# Patient Record
Sex: MALE | Race: ASIAN | NOT HISPANIC OR LATINO | Employment: UNEMPLOYED | URBAN - METROPOLITAN AREA
[De-identification: names, ages, dates, MRNs, and addresses within clinical notes are randomized per-mention and may not be internally consistent; named-entity substitution may affect disease eponyms.]

---

## 2017-01-27 ENCOUNTER — OFFICE VISIT - HEALTHEAST (OUTPATIENT)
Dept: FAMILY MEDICINE | Facility: CLINIC | Age: 27
End: 2017-01-27

## 2017-01-27 DIAGNOSIS — Z23 INFLUENZA VACCINATION GIVEN: ICD-10-CM

## 2017-01-27 DIAGNOSIS — D17.1 LIPOMA OF BACK: ICD-10-CM

## 2017-01-27 DIAGNOSIS — J30.9 ALLERGIC RHINITIS: ICD-10-CM

## 2017-01-27 DIAGNOSIS — Z23 NEED FOR PROPHYLACTIC VACCINATION AND INOCULATION AGAINST INFLUENZA: ICD-10-CM

## 2017-01-27 DIAGNOSIS — Z76.89 ENCOUNTER TO ESTABLISH CARE: ICD-10-CM

## 2017-01-27 DIAGNOSIS — D17.1 LIPOMA OF ANTERIOR CHEST WALL: ICD-10-CM

## 2017-01-27 DIAGNOSIS — D17.1 LIPOMA OF ABDOMINAL WALL: ICD-10-CM

## 2017-01-27 ASSESSMENT — MIFFLIN-ST. JEOR: SCORE: 1953.11

## 2017-02-02 ENCOUNTER — COMMUNICATION - HEALTHEAST (OUTPATIENT)
Dept: SURGERY | Facility: CLINIC | Age: 27
End: 2017-02-02

## 2017-03-01 ENCOUNTER — OFFICE VISIT - HEALTHEAST (OUTPATIENT)
Dept: SURGERY | Facility: CLINIC | Age: 27
End: 2017-03-01

## 2017-03-01 DIAGNOSIS — D17.1 LIPOMA OF ABDOMINAL WALL: ICD-10-CM

## 2017-03-01 DIAGNOSIS — D17.1 LIPOMA OF FLANK: ICD-10-CM

## 2017-03-01 ASSESSMENT — MIFFLIN-ST. JEOR: SCORE: 1952.83

## 2017-08-02 ENCOUNTER — OFFICE VISIT - HEALTHEAST (OUTPATIENT)
Dept: FAMILY MEDICINE | Facility: CLINIC | Age: 27
End: 2017-08-02

## 2017-08-02 DIAGNOSIS — M70.72 BURSITIS, HIP, LEFT: ICD-10-CM

## 2017-08-02 DIAGNOSIS — M25.50 ARTHRALGIA: ICD-10-CM

## 2017-08-02 ASSESSMENT — MIFFLIN-ST. JEOR: SCORE: 1930.71

## 2017-08-03 LAB — ANA SER QL: 0.2 U

## 2017-08-07 ENCOUNTER — COMMUNICATION - HEALTHEAST (OUTPATIENT)
Dept: FAMILY MEDICINE | Facility: CLINIC | Age: 27
End: 2017-08-07

## 2017-08-15 ENCOUNTER — OFFICE VISIT - HEALTHEAST (OUTPATIENT)
Dept: FAMILY MEDICINE | Facility: CLINIC | Age: 27
End: 2017-08-15

## 2017-08-15 DIAGNOSIS — M25.50 ARTHRALGIA: ICD-10-CM

## 2017-08-18 ENCOUNTER — COMMUNICATION - HEALTHEAST (OUTPATIENT)
Dept: FAMILY MEDICINE | Facility: CLINIC | Age: 27
End: 2017-08-18

## 2017-11-30 ENCOUNTER — AMBULATORY - HEALTHEAST (OUTPATIENT)
Dept: NURSING | Facility: CLINIC | Age: 27
End: 2017-11-30

## 2017-11-30 DIAGNOSIS — Z23 NEED FOR IMMUNIZATION AGAINST INFLUENZA: ICD-10-CM

## 2018-08-20 ENCOUNTER — OFFICE VISIT - HEALTHEAST (OUTPATIENT)
Dept: FAMILY MEDICINE | Facility: CLINIC | Age: 28
End: 2018-08-20

## 2018-08-20 DIAGNOSIS — M79.662 PAIN OF LEFT LOWER LEG: ICD-10-CM

## 2018-08-20 RX ORDER — NAPROXEN 500 MG/1
500 TABLET ORAL 2 TIMES DAILY WITH MEALS
Qty: 60 TABLET | Refills: 3 | Status: SHIPPED | OUTPATIENT
Start: 2018-08-20 | End: 2024-04-20

## 2018-08-20 ASSESSMENT — MIFFLIN-ST. JEOR: SCORE: 1916.54

## 2019-06-18 ENCOUNTER — OFFICE VISIT - HEALTHEAST (OUTPATIENT)
Dept: FAMILY MEDICINE | Facility: CLINIC | Age: 29
End: 2019-06-18

## 2019-06-18 DIAGNOSIS — H10.13 ALLERGIC CONJUNCTIVITIS, BILATERAL: ICD-10-CM

## 2019-06-18 DIAGNOSIS — M25.561 ARTHRALGIA OF BOTH KNEES: ICD-10-CM

## 2019-06-18 DIAGNOSIS — R22.9 SUBCUTANEOUS NODULES: ICD-10-CM

## 2019-06-18 DIAGNOSIS — M25.562 ARTHRALGIA OF BOTH KNEES: ICD-10-CM

## 2019-06-18 LAB — ERYTHROCYTE [SEDIMENTATION RATE] IN BLOOD BY WESTERGREN METHOD: 18 MM/HR (ref 0–15)

## 2019-06-18 RX ORDER — OLOPATADINE HYDROCHLORIDE 1 MG/ML
SOLUTION/ DROPS OPHTHALMIC
Qty: 5 ML | Refills: 12 | Status: SHIPPED | OUTPATIENT
Start: 2019-06-18

## 2019-06-18 ASSESSMENT — MIFFLIN-ST. JEOR: SCORE: 1946.58

## 2019-06-19 ENCOUNTER — COMMUNICATION - HEALTHEAST (OUTPATIENT)
Dept: FAMILY MEDICINE | Facility: CLINIC | Age: 29
End: 2019-06-19

## 2021-05-29 NOTE — TELEPHONE ENCOUNTER
Fax received from [facility] requesting Prior Authorization    Medication Name:   olopatadine (PATANOL) 0.1 % ophthalmic solution 5 mL 12 6/18/2019     Sig: Apply 1 drop in each eye twice  daily    Class: Print          Insurance Plan: SUYAPA   PBM: [employer]   Patient ID Number: [id number]    Please start PA process

## 2021-05-29 NOTE — PROGRESS NOTES
ASSESSMENT AND PLAN:  1. Subcutaneous nodules  Relatively new not present 2 years ago the areas are not tender and are not enlarging he has 4 identifiable sites measured today.  He denies any tightening of the skin on his hands or face.  Denies any discomfort or problems with swallowing.  - Erythrocyte Sedimentation Rate    2. Arthralgia of both knees    Takes naproxen intermittently had a ESR which was grossly elevated 2 years ago takes a medication semi-regularly last took naproxen 4 weeks ago checking an ESR today.  - Erythrocyte Sedimentation Rate      3.  Allergic conjunctivitis of both eyes.  Patanol drops started.  Side effects discussed      Orders Placed This Encounter   Procedures     Erythrocyte Sedimentation Rate     There are no discontinued medications.    No follow-ups on file.    CHIEF COMPLAINT:  Chief Complaint   Patient presents with     Other     Check lumps on arm and body. Possible Lipoma     Eye itching     C/o both eyes itching x 2 days.        HISTORY OF PRESENT ILLNESS:  Clementina is a 29 y.o. male who presents to the clinic today for lumps on his body and eye itching. Clementina is present with a Kimberly . He has three lumps: one above his left nipple, one by his left elbow, and one on his abdomen. The patient has had them for several years. They have not changed. Of note, I last saw him two years ago for arthralgias that was managed well with naproxen though this was stopped a month ago. He does have history of elevated ESR though rheumatoid factor was negative. Clementina does report pain on the posterior aspect of his left knee, lower leg, and ankle. It is a dull type of pain. Walking does not exacerbate the pain.    He has had itchy eyes for two days.    REVIEW OF SYSTEMS:   Eyes: Itchy eyes.  Musculoskeletal: Some myalgias in left lower leg.  Skin: Lump above left nipple, by left elbow, and above his umbilicus.  All other 10 point review of systems are negative.    PFSH:  No family history of  "nodules or arthritis.  Reviewed as below.     TOBACCO USE:  Social History     Tobacco Use   Smoking Status Current Some Day Smoker   Smokeless Tobacco Never Used       VITALS:  Vitals:    06/18/19 1456   BP: 102/70   Patient Site: Left Arm   Pulse: 83   Resp: 16   Temp: 97.9  F (36.6  C)   TempSrc: Oral   SpO2: 96%   Weight: 210 lb (95.3 kg)   Height: 5' 11.75\" (1.822 m)     Wt Readings from Last 3 Encounters:   06/18/19 210 lb (95.3 kg)   08/20/18 206 lb (93.4 kg)   08/15/17 209 lb 2 oz (94.9 kg)     Body mass index is 28.68 kg/m .      PHYSICAL EXAM:  General: Alert, cooperative, no distress, appears stated age  HENT: Normocephalic, without obvious abnormality, atraumatic, moist mucous membranes  Eyes: PERRL, conjunctival injection noted lateral canthus of right eye medial canthus of left eye, EOM's intact  Lungs: Clear to auscultation bilaterally, respirations unlabored  Heart: Regular rate and rhythm, S1 and S2 normal, no murmur, rub, or gallop  Neurologic:  A & O x 3.  No tremor, no focal findings.  Normal gait.   Skin: Nodule noted above his left nipple. Another noted 2 cm inferior to left elbow. Third ntoed 4 cm superior to umbilicus.    DATA REVIEWED:  Additional History from Old Records Summarized (2): Reviewed old note from 08/15/2017 regarding arthralgias. Reviewed Dr. Zamora's 08/20/2019 note regarding left lower leg/knee pain.  Decision to Obtain Records (1): none  Radiology Tests Summarized or Ordered (1): none  Labs Reviewed or Ordered (1): ESR ordered.  Medicine Test Summarized or Ordered (1): none  Independent Review of EKG or X-RAY(2 each): none      IBeverly, am scribing for and in the presence of, Dr. Downing.    Dr. Chang GILBERT, personally performed the services described in this documentation, as scribed by Beverly Cadet in my presence, and it is both accurate and complete.      MEDICATIONS:  Current Outpatient Medications   Medication Sig Dispense Refill     naproxen (NAPROSYN) 500 MG tablet " Take 1 tablet (500 mg total) by mouth 2 (two) times a day with meals. 60 tablet 3     No current facility-administered medications for this visit.    Total amount time spent in today's visit was 25 minutes, greater than 50% of this time was spent discussing the pathophysiology of skin nodules, arthralgia conjunctivitis    Total Data Points: 1    Please note that this clinical encounter uses voice recognition software, there may be typographical errors present

## 2021-05-29 NOTE — TELEPHONE ENCOUNTER
Central PA team  [phone]  Pool: HE PA MED ([id number])          PA has been initiated.       PA form completed and faxed insurance via Cover My Meds     Key:  [id number]     Medication:  Olopatadine HCl 0.1% solution    Insurance:  [employer] [place]        Response will be received via fax and may take up to 5-10 business days depending on plan

## 2021-05-30 VITALS — WEIGHT: 214.06 LBS | BODY MASS INDEX: 29.97 KG/M2 | HEIGHT: 71 IN

## 2021-05-30 VITALS — HEIGHT: 71 IN | BODY MASS INDEX: 29.96 KG/M2 | WEIGHT: 214 LBS

## 2021-05-31 VITALS — BODY MASS INDEX: 29.37 KG/M2 | WEIGHT: 209.13 LBS

## 2021-05-31 VITALS — WEIGHT: 210 LBS | BODY MASS INDEX: 29.4 KG/M2 | HEIGHT: 71 IN

## 2021-06-01 VITALS — BODY MASS INDEX: 28.84 KG/M2 | WEIGHT: 206 LBS | HEIGHT: 71 IN

## 2021-06-03 VITALS — HEIGHT: 72 IN | WEIGHT: 210 LBS | BODY MASS INDEX: 28.44 KG/M2

## 2021-06-08 NOTE — PROGRESS NOTES
ASSESSMENT AND PLAN:  1. Encounter to establish care  Previously seen for his refugee screening in [facility] [place] [facility] [place] no access to records does know his immune status.  I'll have him return in approximately 3 months for a physical.    2. Allergic rhinitis  He's had problems with his nose since age of 11 he has bilateral rhinitis starting Flonase side effects discussed    3. Influenza vaccination given  Vaccine given today    4. Lipoma of back  He has 1 solitary lipoma noted on his back is not tender he wants to have it evaluated and possibly removed    5. Lipoma of abdominal wall  He has another isolated solitary lipoma present in the right upper quadrant of the abdomen he says is getting larger but it's not tender    6. Lipoma of anterior chest wall  He has a lipoma only anterior lateral chest wall is located next to the sixth rib    He wants to see surgery to have it excised      No orders of the defined types were placed in this encounter.    There are no discontinued medications.    Return in about 3 months (around 4/27/2017) for f/u nasal congestion, Annual physical.    CHIEF COMPLAINT:  Chief Complaint   Patient presents with     stuffy nose     Establish care     Pt also would like flu vaccine       HISTORY OF PRESENT ILLNESS:  Clementina is a 26 y.o. male presenting to the clinic today to establish care. He is accompanied by a Kimberly  to the clinic today. His refugee screen was in [place], [place]. [place] lived in [place] before he moved to [place]; he has now been in [place] for two years. This is his first time to the clinic. He is currently working as a  full-time. He never had a full physical when he was living in [place]. [place] the [place] himself and has only been to a clinic once for his refugee screen.     Sinus Congestion: He has had sinus congestion for a significant amount of time. He states that he has had this since he was 11 years old. The congestion causes him to need to breathe out of his mouth  "because his nose is too stuffy. The congestion is not associated with headaches. He has not been sneezing a lot. He notes that he snores at night. At times he has a cough. He denies any shortness of breath. He does not have any wheezing or asthma.     Health Maintenance: He received the influenza vaccination today.     REVIEW OF SYSTEMS:   He is able to eat spicy foods without abdominal discomfort, burning, or heartburn. He denies any chest pain or pressure. He has a small lump on his left sided rib, one over his left SI joint, and one in his RUQ.   All other systems are negative.    PFSH:  Social: [place] been in the [place] for two [place].     History reviewed. No pertinent past medical history.  History reviewed. No pertinent past surgical history.  History reviewed. No pertinent family history.  Social History     Social History     Marital status:      Spouse name: N/A     Number of children: N/A     Years of education: N/A     Occupational History     Not on file.     Social History Main Topics     Smoking status: Current Some Day Smoker     Smokeless tobacco: Current User     Types: Snuff     Alcohol use Not on file     Drug use: Not on file     Sexual activity: Not on file     Other Topics Concern     Not on file     Social History Narrative     No narrative on file       VITALS:  Vitals:    01/27/17 0823   BP: 120/78   Patient Site: Right Arm   Patient Position: Sitting   Cuff Size: Adult Large   Pulse: 88   Resp: 20   Temp: 99  F (37.2  C)   TempSrc: Oral   Weight: 214 lb 1 oz (97.1 kg)   Height: 5' 11\" (1.803 m)     Wt Readings from Last 3 Encounters:   01/27/17 214 lb 1 oz (97.1 kg)     Body mass index is 29.86 kg/(m^2).    PHYSICAL EXAM:  General: Alert, cooperative, no distress, appears stated age  Head: Normocephalic, without obvious abnormality, atraumatic  Eyes: PERRL, conjunctiva/cornea clear, EOM's intact, fundi benign, both eyes  Ears: Normal TM's and external ear canals, both ears  Nose: Right " side mucosa is normal in appearance, but inferior turbinate enlarged, left side interior turbinate boggy.   Throat: Fairly large tongue. Erythema pos pharyngeal wall, no tonsillar enlargement.   Back: Symmetric, no curvature, ROM normal, no CVA tenderness  Lungs: Clear to auscultation bilaterally, respirations unlabored  Heart: Regular rate and rhythm, S1 and S2 normal, no murmur, rub, or gallop  Abdomen: Soft, non tender, bowel sounds active all four quadrants, no masses, no organomegaly.   Skin: Elevated solid skin lesion on the surface of left 6th rib, on the RUQ abdomen, 1 x 1 cm, one over the left SI joint.  Psych: He has a normal mood and affect.     DATA REVIEWED:  Additional History from Old Records Summarized (2): None.  Decision to Obtain Records (1): None.  Radiology Tests Summarized or Ordered (1): None.  Labs Reviewed or Ordered (1): None.  Medicine Test Summarized or Ordered (1): None.  Independent Review of EKG or X-RAY(2 each): None.    The visit lasted a total of 16 minutes face to face with the patient. Over 50% of the time was spent counseling and educating the patient about sinus congestion.    IJenae, am scribing for and in the presence of, Dr. Downing.    IDr. Downing, personally performed the services described in this documentation, as scribed by Jenae England in my presence, and it is both accurate and complete.      MEDICATIONS:  Current Outpatient Prescriptions   Medication Sig Dispense Refill     fluticasone (FLONASE) 50 mcg/actuation nasal spray 1 spray into each nostril daily. 16 g 4     No current facility-administered medications for this visit.        Total Data Points: None.

## 2021-06-09 NOTE — PROGRESS NOTES
"HPI: Pt is here with concerns about a subcutaneous mass located on his L flank and on his abdominal wall.  It has been present for 1 month.   This lesion is not tender.  The lesion has not drained.    Allergies:Review of patient's allergies indicates no known allergies.    Past Medical History:   Diagnosis Date     Lipoma 03/01/2017       History reviewed. No pertinent surgical history.    REVIEW OF SYSTEMS:  10 point ROS is negative except for; as mentioned above.    CURRENT MEDS:    Current Outpatient Prescriptions:      fluticasone (FLONASE) 50 mcg/actuation nasal spray, 1 spray into each nostril daily., Disp: 16 g, Rfl: 4    Visit Vitals     BP (!) 141/96 (Patient Site: Right Arm, Patient Position: Sitting, Cuff Size: Adult Large)     Pulse 80     Ht 5' 11\" (1.803 m)     Wt 214 lb (97.1 kg)     SpO2 98%     BMI 29.85 kg/m2     Body mass index is 29.85 kg/(m^2).    EXAM:  GENERAL:Well developed he appears his stated age  HEAD & NECK: Extraocular motions intact, anicteric sclera,  ABDOMEN: Soft and nondistended, positive bowel sounds  LUNGS:  CTA  HEART:  RRR  EXTREMITIES: Full mobility,   INTEGUMENT: The patient has a subcutaneous lesion located on his left flank and another on his R abdominal wall.   The lesion is 1.5 cm wide.    Assessment/Plan: The pt has a  1.5 cm  subcutaneous lesions located on the L flank and the R abdomin.    This lesion is likely either a Lipoma. If these lesion start growing in size and/or become painful I would recommend removing them.  Otherwise these lesions pose very little if any risk to the patient and I would recommend leaving them alone.     Follow up in 1 year to reassess or whenever the pt wants to be re-evaluated..    Jonathan Varghese MD  [phone]  [facility] Department of Surgery  "

## 2021-06-09 NOTE — PROGRESS NOTES
Patient was informed of a resident following the surgeon today. Patient understood and confirmed it was ok for resident in the exam room.

## 2021-06-12 NOTE — PROGRESS NOTES
Subjective:   Clementina Alva presents today with an .  He has a one-week history of joint pain.  His hands, right knee and left hip all are aching.  His feet also hurt.  He notes swelling in his hands.  His hands also itch.  He is concerned because he went hunting and was walking in the woods and he worries that he may have developed Lyme disease.  He has never had this problem in the past.  His feet pain seemed to affect how well he can walk.  He denies fevers, sweats or chills.  His appetite is good.  He denies any rash of any kind.      Objective:  HEENT: All within normal limits  Neck: No increased JVD noted.  No thyromegaly present.  Cardiac: There is a regular rhythm present.  No murmur heard.  Lungs: Lungs are clear bilaterally.  Patient's in no respiratory distress.  Extremities: The left hip had some mild tenderness laterally.  There was minimal pain on passive motion of the hip.  The right knee had palpable tenderness over the medial joint line.  Anterior drawer and Lachman's were negative.  No medial or lateral instability present.  Both hands had swelling over the PIP and MCP joints.  No erythema noted.  No increased warmth present.  The patient had full motion of the hands.  No rash noted in the hands today.  The feet had mild swelling over the MTP joints.  Minimal tenderness noted to palpation today.  No increased warmth or redness noted in the joints.  Abdomen: Abdomen today is soft and nontender.      Assessment:  1.  Arthralgias.  Rule out inflammatory arthritis.  Rule out Lyme disease.  2.  Left hip pain most consistent with bursitis      Plan:  Routine lab work will be done today to rule out inflammatory arthritis causes.  The patient will be started on Naprosyn 500 mg twice daily.  He will limit activity until pain is controlled.  He can add Tylenol for pain control as well.  He will follow-up here if new symptoms would arise or have his present symptoms would persist.  He will be called  with any abnormalities on his lab work.

## 2021-06-12 NOTE — PROGRESS NOTES
ASSESSMENT AND PLAN:  1. Arthralgia patient returns today and has no symptoms.  However he has been using naproxen daily last dose was yesterday.  Mentions that his return to work.  His ESR was elevated above 75, other test done by Dr. Meraz which were reviewed included a negative rheumatoid factor.  Today he has no swelling over his hands or wrists.  And no pain noted in his right arm.  Repeating an ESR I will call him back with results.  He has no prior history of arthralgia.  No history is of being hospitalized with high infection or fever when he was younger.  No history of heart disease. Erythrocyte Sedimentation Rate     CHIEF COMPLAINT:  Chief Complaint   Patient presents with     Follow-up       HISTORY OF PRESENT ILLNESS:  Clementina is a 27 y.o. male presenting for a follow-up. Clementina is present with a [employer] . He was seen by Dr. Meraz on 8/2/2017 for an evaluation joint pain and aching. During the visit, he stated that he previously went fishing in the woods, and he worries that he may have developed Lyme disease. His Lyme antibody test was negative but his ESR was found to be elevated at 78. He was prescribed naproxen. He is currently taking the naproxen 500 mg twice a day. He notes that his joint pain has improved with the medication. He denies previous joint pain.     Health Maintenance: He is due for a Tdap vaccine which he agreeable to receiving.     REVIEW OF SYSTEMS:   He denies sore throat, nausea and vomiting.  He denies difficulty breathing or chest pain.   All other 10 point review of systems are negative.    PFSH:  He currently works in cleaning. Reviewed as below.     TOBACCO USE:  History   Smoking Status     Current Some Day Smoker   Smokeless Tobacco     Current User     Types: Snuff       VITALS:  Vitals:    08/15/17 0832   BP: 122/80   Patient Site: Left Arm   Patient Position: Sitting   Cuff Size: Adult Large   Pulse: 84   Temp: 97.9  F (36.6  C)   TempSrc: Oral   SpO2: 97%    Weight: 209 lb 2 oz (94.9 kg)     Wt Readings from Last 3 Encounters:   08/15/17 209 lb 2 oz (94.9 kg)   08/02/17 210 lb (95.3 kg)   03/01/17 214 lb (97.1 kg)     Body mass index is 29.37 kg/(m^2).    PHYSICAL EXAM:  General: Alert, cooperative, no distress, appears stated age  Lungs: Clear to auscultation bilaterally, respirations unlabored  Chest wall: No tenderness or deformity  Heart: Regular rate and rhythm, S1 and S2 normal, no murmur, rub, or gallop  Neurologic:  A & O x 3.  No tremor, no focal findings.  Musculoskeletal full range of motion when he flexes and extends his fingers, no swelling or tenderness noted on the PIP/DIP joints of either hand    DATA REVIEWED:  Additional History from Old Records Summarized (2): Reviewed Dr. Meraz's note from 8/2/2017 regarding joint pain.  Decision to Obtain Records (1): None  Radiology Tests Summarized or Ordered (1): None  Labs Reviewed or Ordered (1): Reviewed labs from 8/2/2017. Labs ordered.   Medicine Test Summarized or Ordered (1): None  Independent Review of EKG or X-RAY(2 each): None    The visit lasted a total of 11 minutes face to face with the patient. Over 50% of the time was spent counseling and educating the patient about joint pain.     Rodo GILBERT, am scribing for and in the presence of, Dr. Downing.    jared GILBERT personally performed the services described in this documentation, as scribed by Rodo Cardozo in my presence, and it is both accurate and complete.      MEDICATIONS:  Current Outpatient Prescriptions   Medication Sig Dispense Refill     fluticasone (FLONASE) 50 mcg/actuation nasal spray 1 spray into each nostril daily. 16 g 4     naproxen (NAPROSYN) 500 MG tablet Take 1 tablet (500 mg total) by mouth 2 (two) times a day with meals. 60 tablet 3     No current facility-administered medications for this visit.        Total Data Points: 3

## 2021-06-16 NOTE — TELEPHONE ENCOUNTER
Telephone Encounter by Briana Gonzales at 6/21/2019  8:39 AM     Author: Briana Gonzales Service: -- Author Type: --    Filed: 6/21/2019  8:41 AM Encounter Date: 6/19/2019 Status: Signed    : Briana Gonzales       PRIOR AUTHORIZATION DENIED    Denial Rational: Patient must try and fail: cromolyn ophthalmic sol 4%, azelastine 0.05, OTC ketotifen or alaway          Appeal Information: If provider would like to appeal please provide a letter of medical necessity stating why formulary alternatives would not be clinically appropriate for the patient and route back to the PA team.

## 2021-06-19 NOTE — LETTER
Letter by Estevan Downing MD at      Author: Estevan Downing MD Service: -- Author Type: --    Filed:  Encounter Date: 6/18/2019 Status: (Other)         June 18, 2019     Patient: Clementina Alva   YOB: 1990   Date of Visit: 6/18/2019       To Whom It May Concern:    It is my medical opinion that Clementina Alva may return to full duty immediately with no restrictions on 06/18/2019.    If you have any questions or concerns, please don't hesitate to call.    Sincerely,        Electronically signed by Estevan Downing MD

## 2021-06-19 NOTE — PROGRESS NOTES
Assessment/Plan:        Diagnoses and all orders for this visit:    Pain of left lower leg- knee.  Markedly improved with naproxen, continue prn for pain.  Reviewed with him the importance of taking this medication with food.  RTc if worsens or does not resolve.   -     naproxen (NAPROSYN) 500 MG tablet; Take 1 tablet (500 mg total) by mouth 2 (two) times a day with meals.  Dispense: 60 tablet; Refill: 3              Subjective:    Patient ID: Clementina Alva is a 28 y.o. male.    HPI:  Here to follow up on his left leg.  pain. The naproxen helps a lot, has good relief the entire  day if he takes one. No problems from the medication.  No injury prior to the pain starting, located behind his left knee.  No redness or swelling.  Only hurt when he bore weight on it.     The following portions of the patient's history were reviewed and updated as appropriate: allergies, current medications, past family history, past medical history, past social history, past surgical history and problem list.    Review of Systems  10 sys rev neg other than HPI        Objective:    Physical Exam       Patient is in no apparent physical distress.  Vitals are as recorded.  Head and face are normal.  Conjunctiva are clear.  Neck is without adenopathy or masses.  Cardiovascular :  Regular rate and rhythm with no murmurs.  Lungs are clear with good air movement bilaterally.  Extremities are without edema. Left leg without edema, erythema, tenderness.  Gait is normal.  Skin is without rashes.  Mood and affect are appropriate.

## 2024-03-20 ENCOUNTER — OFFICE VISIT (OUTPATIENT)
Dept: FAMILY MEDICINE | Facility: CLINIC | Age: 34
End: 2024-03-20
Payer: COMMERCIAL

## 2024-03-20 VITALS
BODY MASS INDEX: 31.5 KG/M2 | HEART RATE: 90 BPM | RESPIRATION RATE: 14 BRPM | HEIGHT: 71 IN | WEIGHT: 225 LBS | TEMPERATURE: 99 F | SYSTOLIC BLOOD PRESSURE: 129 MMHG | DIASTOLIC BLOOD PRESSURE: 82 MMHG | OXYGEN SATURATION: 98 %

## 2024-03-20 DIAGNOSIS — M54.41 ACUTE RIGHT-SIDED LOW BACK PAIN WITH RIGHT-SIDED SCIATICA: Primary | ICD-10-CM

## 2024-03-20 PROCEDURE — 99203 OFFICE O/P NEW LOW 30 MIN: CPT | Performed by: FAMILY MEDICINE

## 2024-03-20 RX ORDER — IBUPROFEN 800 MG/1
800 TABLET, FILM COATED ORAL EVERY 8 HOURS PRN
Qty: 50 TABLET | Refills: 3 | Status: SHIPPED | OUTPATIENT
Start: 2024-03-20

## 2024-03-20 NOTE — PROGRESS NOTES
"  Assessment & Plan     Acute right-sided low back pain with right-sided sciatica    Continuing for 3 weeks.  Use ibuprofen.    Avoid bending.    Recheck if not resolving.      - ibuprofen (ADVIL/MOTRIN) 800 MG tablet  Dispense: 50 tablet; Refill: 3      Prescription drug management        Nicotine/Tobacco Cessation  He reports that he has been smoking. He has never used smokeless tobacco.        BMI  Estimated body mass index is 31.5 kg/m  as calculated from the following:    Height as of this encounter: 1.8 m (5' 10.87\").    Weight as of this encounter: 102.1 kg (225 lb).             Subjective   Clementina is a 34 year old, presenting for the following health issues:  Back Pain        3/20/2024    11:47 AM   Additional Questions   Roomed by maria del carmen negron     History of Present Illness       Back Pain:  He presents for follow up of back pain. Patient's back pain is a recurring problem.  Location of back pain:  Right lower back and right middle of back  Description of back pain: gnawing, sharp, shooting and stabbing  Back pain spreads: right buttocks and right thigh    Since patient first noticed back pain, pain is: gradually worsening  Does back pain interfere with his job:  Yes                 Right low back pain began 2/26/24.  Has improved somewhat.  Has not previously had back pain.    Tylenol helps slightly.    At work, he sprays machines.    Current Outpatient Medications   Medication Sig Dispense Refill    ibuprofen (ADVIL/MOTRIN) 800 MG tablet Take 1 tablet (800 mg) by mouth every 8 hours as needed for moderate pain 50 tablet 3    olopatadine (PATANOL) 0.1 % ophthalmic solution [OLOPATADINE (PATANOL) 0.1 % OPHTHALMIC SOLUTION] Apply 1 drop in each eye twice  daily 5 mL 12             Objective    /82   Pulse 90   Temp 99  F (37.2  C) (Oral)   Resp 14   Ht 1.8 m (5' 10.87\")   Wt 102.1 kg (225 lb)   SpO2 98%   BMI 31.50 kg/m    Body mass index is 31.5 kg/m .  Physical Exam     Heart normal  Lungs " normal  Back: right lumbar paraspinal tenderness.  No midline tenderness.  Straight leg raise positive, to right anterior thigh.  Forward bending: he is able to touch mid shin.  Knee and ankle reflexes normal.  Leg strength normal bilateral.          Signed Electronically by: Usman Bhatia MD